# Patient Record
(demographics unavailable — no encounter records)

---

## 2025-01-08 NOTE — ASSESSMENT
[FreeTextEntry1] : ================= Coronary artery disease      S/P angioplasty and stent of the circumflex artery in October of 2000.       S/P angioplasty and placement of an [place] stent to mid RCA in December, 2009 by Dr. Sanz. At that time, a 50% lesion seen in the distal circumflex which did not require intervention.  Hyperlipidemia  Mild enlargement of the ascending aorta and aortic arch (4.2 cm).  Arthritis, possibly rheumatoid.  Nephrolithiasis

## 2025-01-08 NOTE — HISTORY OF PRESENT ILLNESS
[FreeTextEntry1] : As he returns, he remains active and well.  He continues all of his usual activities, traveling with his wife and playing golf, even occasionally in the colder weather.  He describes no cardiac symptoms - there have been no episodes of exertional chest discomfort or ALVARENGA.  He reports no palpitations; there have been no episodes of lightheadedness/LOC.  He reports no orthopnea or PND.  His regimen of medication is unchanged. There have been no new interval medical problems.

## 2025-01-08 NOTE — DISCUSSION/SUMMARY
[EKG obtained to assist in diagnosis and management of assessed problem(s)] : EKG obtained to assist in diagnosis and management of assessed problem(s) [FreeTextEntry1] : EKG today shows:  Poor baseline.  Sinus Rhythm at 71 bpm.  Normal intervals and axis.  IWMI of PASCUAL; no significant change.  PLAN: 1.  CAD s/p remote inferior wall MI, s/p PCI - Mr. Mclain remains active and free of ischemic sxs. - continue aspirin & statin.      2.  HLD - most recent lipid profile by Dr. Bautista in showed favorable results (LDL was 56). - continue low-fat diet and atorvastatin.   3.  Enlarged Asc. aorta (4.2 cm.) - stable on TTE in April 2023.  Non-contrast CT scan was done in Aug. 2024 which showed maximal diameter of 4.3 cm. in ascending Ao and in prox. aortic arch.  31 minutes spent on today's office visit.    He will return here for a visit in 6 months for an O.V.

## 2025-01-08 NOTE — REASON FOR VISIT
[FreeTextEntry1] :   Edilson Mclain returns for follow-up regarding CAD S/P angioplasty/stent, old MI, hyperlipidemia, on Lipitor, and mild enlargement of the ascending aorta and aortic arch (4.2 cm).

## 2025-01-08 NOTE — ASSESSMENT
[FreeTextEntry1] : ================= Coronary artery disease      S/P angioplasty and stent of the circumflex artery in October of 2000.       S/P angioplasty and placement of an [place] stent to mid RCA in December, 2009 by Dr. Sanz. At that time, a 50% lesion seen in the distal circumflex which did not require intervention.  Hyperlipidemia  Mild enlargement of the ascending aorta and aortic arch (4.2 cm).  Arthritis, possibly rheumatoid.  Nephrolithiasis

## 2025-02-03 NOTE — HISTORY OF PRESENT ILLNESS
[FreeTextEntry1] : /74 year old man seen 03/14/2022 for follow up. He underwent LEFT ureteral stent placement 3/10/2022 for LEFT ureteral stone with fevers. He feels much better, some urgency but no flank pain. No other complaints.  06/20/2022: Patient presents for follow up. He is s/p LEFT ureteroscopy with LLT. COM stone. RBUS showed RIGHT stone burden, no LEFT renal or ureteral stones. No hydro. 24 hr urine showed hypercalciuria, pH 5.6, UO 1.3 L. No new complaints.   02/07/2023: Patient presents for follow up. RBUS and KUB show 10 mm stones bilaterally. No hematuria, no dysuria, no frequency, no urgency, no hesitancy, no straining. No incontinence.  No fevers, no chills, no nausea, no vomiting, no flank pain.   08/07/2023: Patient presents for follow up. RBUS showed 10 mm stone in RIGHT kidney. No hematuria, no dysuria, no frequency, no urgency, no hesitancy, no straining. No incontinence.  No fevers, no chills, no nausea, no vomiting, no flank pain.   02/05/2024: Patient presents for follow up. He reports nocturia x1/night, which is new but not bothersome. No daytime LUTS, no other LUTS. NO flank pain. NO new imaging. PSA 0.9 (09/2023)   02/03/2025: Patient presents for follow up. He denies any new or bothersome LUTS. No flank pain or stone events. PSA 0.9 (11/2024)

## 2025-02-03 NOTE — ASSESSMENT
[FreeTextEntry1] : 78 yo M for prostate cancer screneing. DU benign, PSA acceptable. Discussed stopping screening per A[facility] guidelines, but pt chooses to continue. RTO in 1 year for DU, PSA.

## 2025-02-03 NOTE — PHYSICAL EXAM
[Normal Appearance] : normal appearance [Well Groomed] : well groomed [General Appearance - In No Acute Distress] : no acute distress [Edema] : no peripheral edema [Respiration, Rhythm And Depth] : normal respiratory rhythm and effort [Exaggerated Use Of Accessory Muscles For Inspiration] : no accessory muscle use [Abdomen Tenderness] : non-tender [Abdomen Soft] : soft [Costovertebral Angle Tenderness] : no ~M costovertebral angle tenderness [Urinary Bladder Findings] : the bladder was normal on palpation [Prostate Size ___ gm] : prostate size [unfilled] gm [Normal Station and Gait] : the gait and station were normal for the patient's age [] : no rash [No Focal Deficits] : no focal deficits [Oriented To Time, Place, And Person] : oriented to person, place, and time [Affect] : the affect was normal [Mood] : the mood was normal [No Palpable Adenopathy] : no palpable adenopathy

## 2025-07-16 NOTE — ASSESSMENT
[FreeTextEntry1] : ================= Coronary artery disease      S/P angioplasty and stent of the circumflex artery in October of 2000.       S/P angioplasty and placement of an [place] stent to mid RCA in December, 2009 by Dr. Sanz. At that time, a 50% lesion seen in the distal circumflex which did not require intervention.  Hyperlipidemia  Mild enlargement of the ascending aorta and aortic arch (4.2 cm).  Arthritis, possibly rheumatoid.  Nephrolithiasis

## 2025-07-16 NOTE — HISTORY OF PRESENT ILLNESS
[FreeTextEntry1] : 1/8/25 As he returns, he remains active and well.  He continues all of his usual activities, traveling with his wife and playing golf, even occasionally in the colder weather.  He describes no cardiac symptoms - there have been no episodes of exertional chest discomfort or ALVARENGA.  He reports no palpitations; there have been no episodes of lightheadedness/LOC.  He reports no orthopnea or PND. His regimen of medication is unchanged. There have been no new interval medical problems.   7/16/25 Edilson remains active and well.  Over the summer months, he plays golf regularly.  He walks the course  unless it is an unusually hot or humid day.  He remains free of cardiac symptoms.  There have been no episodes of exertional chest discomfort or exertional dyspnea.  He describes no palpitations & no episodes of lightheadedness. There have been no episodes of orthopnea or PND. Medications are unchanged.

## 2025-07-16 NOTE — ASSESSMENT
[FreeTextEntry1] : ================= Coronary artery disease      S/P angioplasty and stent of the circumflex artery in October of 2000.       S/P angioplasty and placement of an [place] stent to mid RCA in December, 2009 by Dr. Sanz. At that time, a 50% lesion seen in the distal circumflex which did not require intervention.  Hyperlipidemia  Mild enlargement of the ascending aorta and aortic arch (4.2 cm).  Arthritis, possibly rheumatoid.  Nephrolithiasis

## 2025-07-16 NOTE — DISCUSSION/SUMMARY
[EKG obtained to assist in diagnosis and management of assessed problem(s)] : EKG obtained to assist in diagnosis and management of assessed problem(s) [FreeTextEntry1] : EKG today shows:  Sinus Rhythm at 66 bpm.  IWMI of PASCUAL, NS ST/T abn; no significant change.  PLAN: 1.  CAD s/p remote inferior wall MI, s/p PCI - Mr. Mclain remains active and free of ischemic sxs. - continue aspirin & statin.    New Rx sent to drug store for atorvastatin.  2.  HLD - most recent lipid profile by Dr. Bautista showed favorable results (LDL was 56). - continue low-fat diet and atorvastatin.   3.  Enlarged Asc. aorta (4.2 cm.) -  non-contrast CT scan in Aug. 2024 showed maximal diameter of 4.3 cm. in ascending Ao and in prox. aortic arch. -  will arrange f/u TTE at next O.V. to re-assess.  32 minutes spent on today's office visit.    He will return here for a visit in 6 months for an O.V. and TTE.